# Patient Record
Sex: FEMALE | Race: WHITE | Employment: OTHER | URBAN - METROPOLITAN AREA
[De-identification: names, ages, dates, MRNs, and addresses within clinical notes are randomized per-mention and may not be internally consistent; named-entity substitution may affect disease eponyms.]

---

## 2022-12-09 ENCOUNTER — HOSPITAL ENCOUNTER (OUTPATIENT)
Age: 67
Setting detail: OUTPATIENT SURGERY
Discharge: HOME | End: 2022-12-09
Attending: SPECIALIST | Admitting: SPECIALIST
Payer: MEDICARE

## 2022-12-09 ENCOUNTER — ANESTHESIA (OUTPATIENT)
Dept: ENDOSCOPY | Age: 67
End: 2022-12-09
Payer: MEDICARE

## 2022-12-09 ENCOUNTER — ANESTHESIA EVENT (OUTPATIENT)
Dept: ENDOSCOPY | Age: 67
End: 2022-12-09
Payer: MEDICARE

## 2022-12-09 VITALS
BODY MASS INDEX: 41.09 KG/M2 | SYSTOLIC BLOOD PRESSURE: 121 MMHG | RESPIRATION RATE: 14 BRPM | HEIGHT: 70 IN | TEMPERATURE: 97.9 F | WEIGHT: 287.04 LBS | OXYGEN SATURATION: 100 % | DIASTOLIC BLOOD PRESSURE: 64 MMHG | HEART RATE: 56 BPM

## 2022-12-09 PROCEDURE — 74011250636 HC RX REV CODE- 250/636: Performed by: NURSE ANESTHETIST, CERTIFIED REGISTERED

## 2022-12-09 PROCEDURE — 2709999900 HC NON-CHARGEABLE SUPPLY: Performed by: SPECIALIST

## 2022-12-09 PROCEDURE — 74011000258 HC RX REV CODE- 258: Performed by: NURSE ANESTHETIST, CERTIFIED REGISTERED

## 2022-12-09 PROCEDURE — 76060000031 HC ANESTHESIA FIRST 0.5 HR: Performed by: SPECIALIST

## 2022-12-09 PROCEDURE — 76040000019: Performed by: SPECIALIST

## 2022-12-09 RX ORDER — SERTRALINE HYDROCHLORIDE 50 MG/1
50 TABLET, FILM COATED ORAL DAILY
COMMUNITY
Start: 2022-10-10

## 2022-12-09 RX ORDER — NALOXONE HYDROCHLORIDE 0.4 MG/ML
0.4 INJECTION, SOLUTION INTRAMUSCULAR; INTRAVENOUS; SUBCUTANEOUS
Status: DISCONTINUED | OUTPATIENT
Start: 2022-12-09 | End: 2022-12-09 | Stop reason: HOSPADM

## 2022-12-09 RX ORDER — GLUC/MSM/COLGN2/HYAL/ANTIARTH3 375-375-20
1 TABLET ORAL 3 TIMES DAILY
COMMUNITY

## 2022-12-09 RX ORDER — SIMVASTATIN 20 MG/1
20 TABLET, FILM COATED ORAL
COMMUNITY
Start: 2022-10-10

## 2022-12-09 RX ORDER — ACETYLCARNITINE HCL 100 %
POWDER (GRAM) MISCELLANEOUS
COMMUNITY

## 2022-12-09 RX ORDER — PROPOFOL 10 MG/ML
INJECTION, EMULSION INTRAVENOUS
Status: DISCONTINUED | OUTPATIENT
Start: 2022-12-09 | End: 2022-12-09 | Stop reason: HOSPADM

## 2022-12-09 RX ORDER — LEVOTHYROXINE SODIUM 112 UG/1
112 TABLET ORAL DAILY
COMMUNITY
Start: 2022-10-10

## 2022-12-09 RX ORDER — AA/PROT/LYSINE/METHIO/VIT C/B6 50-12.5 MG
TABLET ORAL
COMMUNITY

## 2022-12-09 RX ORDER — CALCIUM POLYCARBOPHIL 625 MG
TABLET ORAL
COMMUNITY

## 2022-12-09 RX ORDER — FLUMAZENIL 0.1 MG/ML
0.2 INJECTION INTRAVENOUS
Status: DISCONTINUED | OUTPATIENT
Start: 2022-12-09 | End: 2022-12-09 | Stop reason: HOSPADM

## 2022-12-09 RX ORDER — SODIUM CHLORIDE 9 MG/ML
INJECTION, SOLUTION INTRAVENOUS
Status: DISCONTINUED | OUTPATIENT
Start: 2022-12-09 | End: 2022-12-09 | Stop reason: HOSPADM

## 2022-12-09 RX ORDER — CYCLOSPORINE 0.5 MG/ML
EMULSION OPHTHALMIC
COMMUNITY
Start: 2022-01-18

## 2022-12-09 RX ORDER — SODIUM CHLORIDE 9 MG/ML
50 INJECTION, SOLUTION INTRAVENOUS CONTINUOUS
Status: DISCONTINUED | OUTPATIENT
Start: 2022-12-09 | End: 2022-12-09 | Stop reason: HOSPADM

## 2022-12-09 RX ORDER — FENTANYL CITRATE 50 UG/ML
25 INJECTION, SOLUTION INTRAMUSCULAR; INTRAVENOUS AS NEEDED
Status: DISCONTINUED | OUTPATIENT
Start: 2022-12-09 | End: 2022-12-09 | Stop reason: HOSPADM

## 2022-12-09 RX ORDER — MIDAZOLAM HYDROCHLORIDE 1 MG/ML
.25-5 INJECTION, SOLUTION INTRAMUSCULAR; INTRAVENOUS AS NEEDED
Status: DISCONTINUED | OUTPATIENT
Start: 2022-12-09 | End: 2022-12-09 | Stop reason: HOSPADM

## 2022-12-09 RX ORDER — DEXTROMETHORPHAN/PSEUDOEPHED 2.5-7.5/.8
1.2 DROPS ORAL
Status: DISCONTINUED | OUTPATIENT
Start: 2022-12-09 | End: 2022-12-09 | Stop reason: HOSPADM

## 2022-12-09 RX ADMIN — PROPOFOL 150 MCG/KG/MIN: 10 INJECTION, EMULSION INTRAVENOUS at 10:40

## 2022-12-09 RX ADMIN — SODIUM CHLORIDE: 9 INJECTION, SOLUTION INTRAVENOUS at 10:37

## 2022-12-09 NOTE — PROGRESS NOTES
Gini Garcia  1955  [id number]    Situation:  Verbal report received from:   Brenton Mckeon RN   Procedure: Procedure(s):  COLONOSCOPY  NO INFO TO     Background:    Preoperative diagnosis: PERSONAL HX OF COLONIC POLYPS, FX HX OF COLONIC POLYPS, FX HX OF MALIGNANT NEOPLASM OF GASTROINTESTINAL TRACT  Postoperative diagnosis: Previous surgery   diverticulosis    :  Dr. Mirlande Bynum  Assistant(s): Endoscopy Technician-1: Link Bautista  Endoscopy RN-1: Suni Dailey RN    Specimens: * No specimens in log *  H. Pylori  no    Assessment:  Intra-procedure medications     Anesthesia gave intra-procedure sedation and medications, see anesthesia flow sheet yes    Intravenous fluids: NS@ KVO     Vital signs stable   yes    Abdominal assessment: round and soft   yes    Recommendation:  Discharge patient per MD order  yes.   Return to floor  outpatient  Family or Friend   caregiver  Permission to share finding with family or friend yes

## 2022-12-09 NOTE — INTERVAL H&P NOTE
Pre-Endoscopy H&P Update  Chief complaint/HPI/ROS:  The indication for the procedure, the patient's history and the patient's current medications are reviewed prior to the procedure and that data is reported on the H&P to which this document is attached. Any significant complaints with regard to organ systems will be noted, and if not mentioned then a review of systems is not contributory. Past Medical History:   Diagnosis Date    Asthma     Cancer ([facility] [address]     skin cancer    Diverticulitis     Ill-defined condition     lung abcess    Ill-defined condition     colon abcess    Psychiatric disorder     depression    Sleep apnea     CPAP    Thyroid disease       Past Surgical History:   Procedure Laterality Date    HX CHOLECYSTECTOMY      HX OOPHORECTOMY      HX PARTIAL COLECTOMY      with ostomy and reversal     Social   Social History     Tobacco Use    Smoking status: Never    Smokeless tobacco: Never   Substance Use Topics    Alcohol use: Not Currently      History reviewed. No pertinent family history. Allergies   Allergen Reactions    Contrast Agent [Iodine] Hives      Prior to Admission Medications   Prescriptions Last Dose Informant Patient Reported? Taking? CHOLECALCIFEROL, VITAMIN D3, PO 12/8/2022  Yes Yes   Sig: Take  by mouth. LYSINE PO 12/8/2022  Yes Yes   Sig: Take  by mouth. MULTIVITAMIN 12 IV 12/6/2022  Yes No   Sig: multivitamin   1 tab PO daily   NIACINAMIDE PO 12/8/2022  Yes Yes   Sig: Take  by mouth. TURMERIC PO 12/8/2022  Yes Yes   Sig: Take  by mouth.    acetylcarnitine HCl (Acetylcarnitine, Bulk,) 100 % powd 12/8/2022  Yes Yes   Sig: acetylcarnitin HCl-a lipoic ac   1 tab PO daily   calcium polycarbophiL (FIBERCON) 625 mg tablet Not Taking  Yes No   Sig: FiberCon   3 tabs PO at night   Patient not taking: Reported on 12/9/2022   calcium polycarbophiL (FiberCon) 625 mg tablet 12/7/2022  Yes No   Sig: FiberCon   3 tabs PO at night   coenzyme q10 10 mg cap 12/8/2022  Yes Yes   Sig: Co Q-10 1 tab PO daily at night   cycloSPORINE (RESTASIS) 0.05 % dpet 12/9/2022  Yes Yes   docosahexanoic acid-eicosapent 120-180 mg cap Not Taking  Yes No   Sig: Take 1,000 mg by mouth daily. Patient not taking: Reported on 12/9/2022   glucosamine sulfate dipotassium-chondroitin sulfate 500-400 mg tablet 12/8/2022  Yes Yes   Sig: Take 1 Tablet by mouth three (3) times daily. levothyroxine (SYNTHROID) 112 mcg tablet 12/9/2022  Yes Yes   Sig: Take 112 mcg by mouth daily. sertraline (ZOLOFT) 50 mg tablet 12/8/2022  Yes Yes   Sig: Take 50 mg by mouth daily. simvastatin (ZOCOR) 20 mg tablet 12/8/2022  Yes Yes   Sig: Take 20 mg by mouth nightly. vitamin E acetate (VITAMIN E PO) 12/8/2022  Yes Yes   Sig: vitamin E   1 tab PO daily      Facility-Administered Medications: None       PHYSICAL EXAM:  The patient is examined immediately prior to the procedure. Visit Vitals  BP (!) 111/49   Pulse (!) 57   Temp 98.5 °F (36.9 °C)   Resp 12   Ht 5' 10\" (1.778 m)   Wt 130.2 kg (287 lb 0.6 oz)   SpO2 98%   Breastfeeding No   BMI 41.19 kg/m²     Gen: Appears comfortable, no distress. Pulm: comfortable respirations with no abnormal audible breath sounds  HEART: well perfused, no abnormal audible heart sounds  GI: abdomen flat. PLAN:  Informed consent discussion held, patient afforded an opportunity to ask questions and all questions answered. After being advised of the risks, benefits, and alternatives, the patient requested that we proceed and indicated so on a written consent form. Will proceed with procedure as planned.   Carie Marcano MD

## 2022-12-09 NOTE — ANESTHESIA POSTPROCEDURE EVALUATION
Procedure(s):  COLONOSCOPY  NO INFO TO .     MAC    Anesthesia Post Evaluation        Patient location during evaluation: bedside  Level of consciousness: awake  Pain management: satisfactory to patient  Airway patency: patent  Anesthetic complications: no  Cardiovascular status: acceptable  Respiratory status: acceptable  Hydration status: acceptable        INITIAL Post-op Vital signs:   Vitals Value Taken Time   /64 12/09/22 1120   Temp 36.6 °C (97.9 °F) 12/09/22 1110   Pulse 56 12/09/22 1120   Resp 14 12/09/22 1120   SpO2 100 % 12/09/22 1120

## 2022-12-09 NOTE — PROGRESS NOTES
Endoscopy discharge instructions have been reviewed and given to patient. The patient verbalized understanding and acceptance of instructions. Dr. Johanna Nowak discussed with patient procedure findings and next steps.

## 2022-12-09 NOTE — ANESTHESIA PREPROCEDURE EVALUATION
Relevant Problems   No relevant active problems       Anesthetic History   No history of anesthetic complications            Review of Systems / Medical History  Patient summary reviewed, nursing notes reviewed and pertinent labs reviewed    Pulmonary  Within defined limits      Sleep apnea    Asthma        Neuro/Psych   Within defined limits      Psychiatric history     Cardiovascular  Within defined limits                     GI/Hepatic/Renal  Within defined limits              Endo/Other  Within defined limits    Hypothyroidism       Other Findings              Physical Exam    Airway  Mallampati: II  TM Distance: 4 - 6 cm  Neck ROM: normal range of motion   Mouth opening: Normal     Cardiovascular    Rhythm: regular  Rate: normal         Dental  No notable dental hx       Pulmonary  Breath sounds clear to auscultation               Abdominal         Other Findings            Anesthetic Plan    ASA: 2  Anesthesia type: MAC            Anesthetic plan and risks discussed with: Patient

## 2022-12-09 NOTE — PROCEDURES
[facility] [address]  [phone]      2022    Colonoscopy Procedure Note  Eleni Grayson  :  1955  Jolie Medical Record Number: [id number]    Indications:     Personal history of colon polyps (screening only)  PCP:  Nicholas Oates MD  Anesthesia/Sedation: Conscious Sedation/Moderate Sedation/GETA, see notes  Endoscopist:  Dr. Elizabeth Negrete  Complications:  None  Estimated Blood Loss:  None    Permit:  The indications, risks, benefits and alternatives were reviewed with the patient or their decision maker who was provided an opportunity to ask questions and all questions were answered. The specific risks of colonoscopy with conscious sedation were reviewed, including but not limited to anesthetic complication, bleeding, adverse drug reaction, missed lesion, infection, IV site reactions, and intestinal perforation which would lead to the need for surgical repair. Alternatives to colonoscopy including radiographic imaging, observation without testing, or laboratory testing were reviewed including the limitations of those alternatives. After considering the options and having all their questions answered, the patient or their decision maker provided both verbal and written consent to proceed. Procedure in Detail:  After obtaining informed consent, positioning of the patient in the left lateral decubitus position, and conduction of a pre-procedure pause or \"time out\" the endoscope was introduced into the anus and advanced to the cecum, which was identified by the ileocecal valve and appendiceal orifice. The quality of the colonic preparation was good. A careful inspection was made as the colonoscope was withdrawn, findings and interventions are described below. Findings:   The patient is status post left partial colectomy with healthy anastomosis.     In the rectum, medium internal hemorrhoids are noted without bleeding. Specimens:    none    Complications:   None; patient tolerated the procedure well. Impression:  Otherwise normal colonoscopy to the cecum    Recommendations:      - Given the absence of polyps today, next colonoscopy 10 years. Thank you for entrusting me with this patient's care. Please do not hesitate to contact me with any questions or if I can be of assistance with any of your other patients' GI needs. Signed By: Ruthy Mckeon MD                        December 9, 2022      Surgical assistant none. Implants none unless specified.

## 2022-12-09 NOTE — H&P
78 y.o. female for open access colonoscopy for screening   Additional data for completion of the targeted pre-endoscopy H&P will be provided under 'H&P interval notes'. Please see that document which will be attached to this.   Omayra Wesley MD

## 2022-12-09 NOTE — PERIOP NOTES
Received recovery report from Anesthesia team, see anesthesia note. ABD remains soft and non-tender post procedure. Pt has no complaints at this time and tolerated the procedure well. Endoscope was pre-cleaned at bedside immediately following procedure by Cherie Doll. Post recovery report given to [facility].

## 2022-12-09 NOTE — DISCHARGE INSTRUCTIONS
[address] [facility] KULWANT Aguayo MD  [phone]      December 9, 2022 Sunday Gavin  YOB: 1955    COLONOSCOPY DISCHARGE INSTRUCTIONS    If there is redness at IV site you should apply warm compress to area. If redness or soreness persist contact Dr. Amber Aguayo' or your primary care doctor. There may be a slight amount of blood passed from the rectum. Gaseous discomfort may develop, but walking, belching will help relieve this. You may not operate a vehicle for 12 hours  You may not operate machinery or dangerous appliances for rest of today  You may not drink alcoholic beverages for 12 hours  Avoid making any critical decisions for 24 hours    DIET:  You may resume your normal diet, but some patients find that heavy or large meals may lead to indigestion or vomiting. I suggest a light meal as first food intake. MEDICATIONS:  The use of some over-the-counter pain medication may lead to bleeding after colon biopsies or polyp removal.  Tylenol (also called acetaminophen) is safe to take even if you have had colonoscopy with polyp removal.  Based on the procedure you had today you may safely take aspirin or aspirin-like products for the next ten (10) days. Remember that Tylenol (also called acetaminophen) is safe to take after colonoscopy even if you have had biopsies or polyps removed. ACTIVITY:  You may resume your normal household activities, but it is recommended that you spend the remainder of the day resting -  avoid any strenuous activity. CALL DR. Katharina Nava' OFFICE IF:  Increasing pain, nausea, vomiting  Abdominal distension (swelling)  Significant new or increased bleeding (oral or rectal)  Fever/Chills  Chest pain/shortness of breath                       Additional instructions:   Great news, no polys or colon cancer.   Current guidelines would suggest you can delay next colonoscopy for 10 years, but if you would prefer an earlier examination I would offer that in 5 years. If so, call the office at that time and we'll get that scheduled. It was an honor to be your doctor today. Please let me or my office staff know if you have any feedback about today's procedure. Helder Melissa MD    Colonoscopy saves lives, and can prevent colon cancer. Everyone aged 48 or older needs colonoscopy.   Tell your family and friends: get the test!

## 2023-05-21 RX ORDER — CHLORAL HYDRATE 500 MG
1000 CAPSULE ORAL DAILY
COMMUNITY

## 2023-05-21 RX ORDER — CYCLOSPORINE 0.5 MG/ML
EMULSION OPHTHALMIC
COMMUNITY
Start: 2022-01-18

## 2023-05-21 RX ORDER — LANOLIN ALCOHOL/MO/W.PET/CERES
1 CREAM (GRAM) TOPICAL 3 TIMES DAILY
COMMUNITY

## 2023-05-21 RX ORDER — SIMVASTATIN 20 MG
20 TABLET ORAL NIGHTLY
COMMUNITY
Start: 2022-10-10

## 2023-05-21 RX ORDER — CALCIUM POLYCARBOPHIL 625 MG
TABLET ORAL
COMMUNITY

## 2023-05-21 RX ORDER — LEVOTHYROXINE SODIUM 112 UG/1
112 TABLET ORAL DAILY
COMMUNITY
Start: 2022-10-10

## 2024-01-23 ENCOUNTER — TELEPHONE (OUTPATIENT)
Age: 69
End: 2024-01-23

## 2024-01-23 NOTE — TELEPHONE ENCOUNTER
Patient calling to schedule a new patient appt with Dr. Chicas. (Referral on file.) Please give her a call to schedule. Thank you!

## 2024-02-01 ENCOUNTER — TELEPHONE (OUTPATIENT)
Age: 69
End: 2024-02-01

## 2024-02-01 NOTE — TELEPHONE ENCOUNTER
Pt called stating she received an estimate in the mail and would like to go over it for more understanding. After mentioning [employer] billing to Pt she stated when she called them she wasn't successful in reaching a [employer] rep, so she's requesting a call back from our office to get a better understanding of the estimate.       Please call: [phone]

## 2024-07-23 ENCOUNTER — OFFICE VISIT (OUTPATIENT)
Age: 69
End: 2024-07-23
Payer: MEDICARE

## 2024-07-23 ENCOUNTER — PROCEDURE VISIT (OUTPATIENT)
Age: 69
End: 2024-07-23
Payer: MEDICARE

## 2024-07-23 DIAGNOSIS — R47.89 WORD FINDING PROBLEM: ICD-10-CM

## 2024-07-23 DIAGNOSIS — F41.1 GENERALIZED ANXIETY DISORDER: ICD-10-CM

## 2024-07-23 DIAGNOSIS — F33.41 RECURRENT MAJOR DEPRESSIVE DISORDER, IN PARTIAL REMISSION (HCC): Primary | ICD-10-CM

## 2024-07-23 DIAGNOSIS — R41.89 COGNITIVE CHANGE: Primary | ICD-10-CM

## 2024-07-23 DIAGNOSIS — Z76.89 SLEEP CONCERN: ICD-10-CM

## 2024-07-23 DIAGNOSIS — R41.840 ATTENTION DEFICIT: ICD-10-CM

## 2024-07-23 PROCEDURE — 4004F PT TOBACCO SCREEN RCVD TLK: CPT | Performed by: CLINICAL NEUROPSYCHOLOGIST

## 2024-07-23 PROCEDURE — 96139 PSYCL/NRPSYC TST TECH EA: CPT | Performed by: CLINICAL NEUROPSYCHOLOGIST

## 2024-07-23 PROCEDURE — 96132 NRPSYC TST EVAL PHYS/QHP 1ST: CPT | Performed by: CLINICAL NEUROPSYCHOLOGIST

## 2024-07-23 PROCEDURE — 1123F ACP DISCUSS/DSCN MKR DOCD: CPT | Performed by: CLINICAL NEUROPSYCHOLOGIST

## 2024-07-23 PROCEDURE — 96133 NRPSYC TST EVAL PHYS/QHP EA: CPT | Performed by: CLINICAL NEUROPSYCHOLOGIST

## 2024-07-23 PROCEDURE — 96138 PSYCL/NRPSYC TECH 1ST: CPT | Performed by: CLINICAL NEUROPSYCHOLOGIST

## 2024-07-23 PROCEDURE — 90791 PSYCH DIAGNOSTIC EVALUATION: CPT | Performed by: CLINICAL NEUROPSYCHOLOGIST

## 2024-07-23 NOTE — PROGRESS NOTES
Intake Note      Patient Name: Nadira Gerard  YOB: 1955    Age: 69 y.o.  Date of Intake: 7/23/2024   Education: 16 Ethnicity White   Gender: Female Referring Provider: Dr. Pitts     REASON FOR REFERRAL AND EVALUATION PROCEDURES:  Nadira Gerard  was referred for evaluation by her Neurologist to assist in differential diagnosis and individualized treatment planning. she understood the rationale and procedures for evaluation, as well as the limits to confidentiality, and agreed to participate. she consented to have this report made available to her  treating providers through her  electronic medical records.   History Sources: Patient and Medical Record    HISTORY OF PRESENT ILLNESS:  The patient is a 69-year-old female with pertinent medical history noted for heart disease, thyroid disease, high cholesterol, sleep apnea, and depression.  She presented independently for clinical interview and was capable of providing adequate history.  Per the patient's report, she has been noticing changes in her cognitive functioning for \"a little while\" that have become more concerning to her, particularly given a family history of neurological issues.  She reported her paternal grandfather and 2 of her paternal aunts developed dementia, her paternal uncle had Parkinson's disease, and her mother passed away due to GBM.  The patient describes her cognitive concerns to include attention deficits (e.g., losing her train of thought), working memory issues (e.g., difficulties holding onto information while completing other tasks), and less efficient word retrieval.  She stated these issues have not interfered in her daily functioning and she remains fully independent in all ADLs and IADLs.    Pertaining to the patient's emotional functioning, she reported a nearly lifelong history of depression and anxiety.  Symptoms of these conditions have been exacerbated at various times following significant stressors, such as

## 2024-08-02 NOTE — PROGRESS NOTES
Neuropsychological Evaluation Report      Patient Name: Nadira Gerard  YOB: 1955    Age: 69 y.o.  Date of Intake: 2024   Education: 16 Date of Testin2024   Gender: Female Ethnicity: White     Referring Provider: Dr. Pitts     REASON FOR REFERRAL AND EVALUATION PROCEDURES:  Nadira Gerard  was referred for neuropsychological evaluation by her Neurologist to obtain a quantitative assessment of her current level of neurocognitive functioning, assist in differential diagnosis, and aid in individualized treatment planning. The patient understood the rationale and procedures for evaluation, as well as the limits to confidentiality, and they agreed to participate. The patient consented to have this report made available to her  treating providers through her  electronic medical records. This evaluation was completed with the patient by Alan Chicas PsyD with the exception of testing by technician, which was completed by MELODIE Beth under the supervision of Dr. Chicas.  History Sources: Patient, Medical Record, and Test Data    SUMMARY AND IMPRESSION:  The following section is a summary of the patient's pertinent test results and impressions. A more thorough review of the patient's background and test scores can be found below.    The patient's neuropsychological evaluation revealed cognitive performances were entirely within normal limits and they were generally commensurate with expectations of premorbid functioning (high average).  However, the patient reported experiencing mild symptoms of anxiety, clinically significant poor sleep quality, and elevated daytime sleepiness.  Her perceived cognitive concerns are likely related to normal aging in addition to emotional distress and poor sleep.        ASSESSMENT:  Cognitive change - NOT SUPPORTED  Generalized anxiety disorder  Sleep concern    RECOMMENDATIONS:  The patient currently has a feedback session scheduled for 2024. During

## 2024-08-06 ENCOUNTER — OFFICE VISIT (OUTPATIENT)
Age: 69
End: 2024-08-06
Payer: MEDICARE

## 2024-08-06 DIAGNOSIS — R41.89 COGNITIVE CHANGE: Primary | ICD-10-CM

## 2024-08-06 PROCEDURE — 96132 NRPSYC TST EVAL PHYS/QHP 1ST: CPT | Performed by: CLINICAL NEUROPSYCHOLOGIST

## 2024-08-06 NOTE — PROGRESS NOTES
Prior to seeing the patient for today's visit, I reviewed pertinent records, including the previously completed report, the records in Epic, and any updated visits from other providers since the patient's last visit.    I provided feedback services related to the previously completed report. Attendees included: Patient. Education was provided regarding my diagnostic impressions, and we discussed treatment plan/options. Attendees were provided with the opportunity to ask questions, which were answered to the best of my ability.    We discussed, in detail, the following:  Reviewed findings from evaluation including test results, diagnosis, and suspected contributing factors  Discussed recommendations outlined in report  Answered questions to the best of my ability    The patient needs to:   Follow up with referring provider for ongoing management and Emphasize modifiable risk and protective factors for cognitive functioning (e.g., exercise, diet, cognitive stimulation; see handout)    The patient had the following reactions to recommendations: Patient reported understanding results and recommendations.  She appeared relieved that testing did not reveal any significant cognitive weaknesses.  She is currently engaging in routine exercise and eats a healthy diet but we reviewed modifiable protective factors.    ASSESSMENT:  Cognitive change - NOT SUPPORTED    TIME SPENT PROVIDING SERVICES:   31 minutes     BILLING:  96132 x 1 Units    *Code 57474 Neuropsychological testing evaluation services include: Integration of patient data, interpretation of standardized test results and clinical data, clinical decision-making, treatment planning and report, and interactive feedback to the patient, family member(s) or caregiver(s), when performed.

## 2025-05-15 ENCOUNTER — TRANSCRIBE ORDERS (OUTPATIENT)
Facility: HOSPITAL | Age: 70
End: 2025-05-15

## 2025-05-15 ENCOUNTER — HOSPITAL ENCOUNTER (OUTPATIENT)
Facility: HOSPITAL | Age: 70
Discharge: HOME | End: 2025-05-18
Payer: MEDICARE

## 2025-05-15 DIAGNOSIS — M53.3 COCCYODYNIA: Primary | ICD-10-CM

## 2025-05-15 DIAGNOSIS — M53.3 COCCYODYNIA: ICD-10-CM

## 2025-05-15 PROCEDURE — 72220 X-RAY EXAM SACRUM TAILBONE: CPT

## 2025-06-23 ENCOUNTER — HOSPITAL ENCOUNTER (EMERGENCY)
Facility: HOSPITAL | Age: 70
Discharge: HOME | End: 2025-06-23
Attending: STUDENT IN AN ORGANIZED HEALTH CARE EDUCATION/TRAINING PROGRAM
Payer: MEDICARE

## 2025-06-23 ENCOUNTER — APPOINTMENT (OUTPATIENT)
Facility: HOSPITAL | Age: 70
End: 2025-06-23
Payer: MEDICARE

## 2025-06-23 VITALS
HEIGHT: 70 IN | BODY MASS INDEX: 30.92 KG/M2 | TEMPERATURE: 98.1 F | SYSTOLIC BLOOD PRESSURE: 138 MMHG | OXYGEN SATURATION: 96 % | HEART RATE: 63 BPM | RESPIRATION RATE: 11 BRPM | WEIGHT: 216 LBS | DIASTOLIC BLOOD PRESSURE: 66 MMHG

## 2025-06-23 DIAGNOSIS — R00.2 PALPITATIONS: Primary | ICD-10-CM

## 2025-06-23 LAB
ANION GAP SERPL CALC-SCNC: 11 MMOL/L (ref 2–12)
BASOPHILS # BLD: 0.05 K/UL (ref 0–0.1)
BASOPHILS NFR BLD: 0.9 % (ref 0–1)
BUN SERPL-MCNC: 20 MG/DL (ref 8–23)
BUN/CREAT SERPL: 35 (ref 12–20)
CALCIUM SERPL-MCNC: 9.5 MG/DL (ref 8.8–10.2)
CHLORIDE SERPL-SCNC: 105 MMOL/L (ref 98–107)
CO2 SERPL-SCNC: 24 MMOL/L (ref 22–29)
CREAT SERPL-MCNC: 0.57 MG/DL (ref 0.5–0.9)
DIFFERENTIAL METHOD BLD: NORMAL
EOSINOPHIL # BLD: 0.11 K/UL (ref 0–0.4)
EOSINOPHIL NFR BLD: 1.9 % (ref 0–7)
ERYTHROCYTE [DISTWIDTH] IN BLOOD BY AUTOMATED COUNT: 13.2 % (ref 11.5–14.5)
GLUCOSE SERPL-MCNC: 89 MG/DL (ref 65–100)
HCT VFR BLD AUTO: 39.3 % (ref 35–47)
HGB BLD-MCNC: 13.3 G/DL (ref 11.5–16)
IMM GRANULOCYTES # BLD AUTO: 0.02 K/UL (ref 0–0.04)
IMM GRANULOCYTES NFR BLD AUTO: 0.4 % (ref 0–0.5)
LYMPHOCYTES # BLD: 1.35 K/UL (ref 0.8–3.5)
LYMPHOCYTES NFR BLD: 23.7 % (ref 12–49)
MAGNESIUM SERPL-MCNC: 2.1 MG/DL (ref 1.6–2.4)
MCH RBC QN AUTO: 31.7 PG (ref 26–34)
MCHC RBC AUTO-ENTMCNC: 33.8 G/DL (ref 30–36.5)
MCV RBC AUTO: 93.6 FL (ref 80–99)
MONOCYTES # BLD: 0.5 K/UL (ref 0–1)
MONOCYTES NFR BLD: 8.8 % (ref 5–13)
NEUTS SEG # BLD: 3.67 K/UL (ref 1.8–8)
NEUTS SEG NFR BLD: 64.3 % (ref 32–75)
NRBC # BLD: 0 K/UL (ref 0–0.01)
NRBC BLD-RTO: 0 PER 100 WBC
PLATELET # BLD AUTO: 257 K/UL (ref 150–400)
PMV BLD AUTO: 11.2 FL (ref 8.9–12.9)
POTASSIUM SERPL-SCNC: 4.3 MMOL/L (ref 3.5–5.1)
POTASSIUM SERPL-SCNC: 5.5 MMOL/L (ref 3.5–5.1)
RBC # BLD AUTO: 4.2 M/UL (ref 3.8–5.2)
SODIUM SERPL-SCNC: 140 MMOL/L (ref 136–145)
TROPONIN T SERPL HS-MCNC: 9.2 NG/L (ref 0–14)
WBC # BLD AUTO: 5.7 K/UL (ref 3.6–11)

## 2025-06-23 PROCEDURE — 93005 ELECTROCARDIOGRAM TRACING: CPT

## 2025-06-23 PROCEDURE — 84484 ASSAY OF TROPONIN QUANT: CPT

## 2025-06-23 PROCEDURE — 71045 X-RAY EXAM CHEST 1 VIEW: CPT

## 2025-06-23 PROCEDURE — 80048 BASIC METABOLIC PNL TOTAL CA: CPT

## 2025-06-23 PROCEDURE — 36415 COLL VENOUS BLD VENIPUNCTURE: CPT

## 2025-06-23 PROCEDURE — 99285 EMERGENCY DEPT VISIT HI MDM: CPT

## 2025-06-23 PROCEDURE — 84132 ASSAY OF SERUM POTASSIUM: CPT

## 2025-06-23 PROCEDURE — 83735 ASSAY OF MAGNESIUM: CPT

## 2025-06-23 PROCEDURE — 85025 COMPLETE CBC W/AUTO DIFF WBC: CPT

## 2025-06-23 ASSESSMENT — PAIN - FUNCTIONAL ASSESSMENT: PAIN_FUNCTIONAL_ASSESSMENT: NONE - DENIES PAIN

## 2025-06-23 NOTE — ED NOTES
Patient states she has never been formally diagnosed with afib by a physician but the reading from her Apple Watch stated afib as the rhythm yesterday

## 2025-06-23 NOTE — ED TRIAGE NOTES
Pt c/o her smart watch said she was in A-fib 3 times yesterday and she had some \"flutters\" during. Her PCP sent her to be evaluated. Pt denies any symptoms now.   Patient arrives to ED ambulatory w/o difficulty. No acute distress noted in triage. A&O x 4. Skin is warm, dry & intact on obs.   EKG in triage. NSR

## 2025-06-23 NOTE — ED PROVIDER NOTES
were made to edit the dictations but occasionally words are mis-transcribed.)    Mart Márquez PA-C (electronically signed)  Physician Assistant            Mart Márquez PA-C  06/23/25 4012

## 2025-06-24 LAB
EKG ATRIAL RATE: 68 BPM
EKG DIAGNOSIS: NORMAL
EKG P AXIS: 57 DEGREES
EKG P-R INTERVAL: 146 MS
EKG Q-T INTERVAL: 376 MS
EKG QRS DURATION: 76 MS
EKG QTC CALCULATION (BAZETT): 399 MS
EKG R AXIS: 59 DEGREES
EKG T AXIS: 65 DEGREES
EKG VENTRICULAR RATE: 68 BPM

## 2025-06-24 PROCEDURE — 93010 ELECTROCARDIOGRAM REPORT: CPT | Performed by: INTERNAL MEDICINE

## (undated) DEVICE — CUFF RMFG BP INF SZ 11 DISP -- LAWSON OEM ITEM 238915

## (undated) DEVICE — BLUNTFILL: Brand: MONOJECT

## (undated) DEVICE — SYR 3ML LL TIP 1/10ML GRAD --

## (undated) DEVICE — KIT COLON W/ 1.1OZ LUB AND 2 END

## (undated) DEVICE — SET GRAV CK VLV NEEDLESS ST 3 GANGED 4WAY STPCOCK HI FLO 10

## (undated) DEVICE — SYR 5ML 1/5 GRAD LL NSAF LF --

## (undated) DEVICE — SOLIDIFIER MEDC 1200ML -- CONVERT TO 356117

## (undated) DEVICE — Device

## (undated) DEVICE — BLUNTFILL WITH FILTER: Brand: MONOJECT

## (undated) DEVICE — ELECTRODE,RADIOTRANSLUCENT,FOAM,3PK: Brand: MEDLINE

## (undated) DEVICE — ADULT SPO2 SENSOR,REMANUFACTURED,REPROCESSED DEVICE FOR SINGLE USE; REPROCESSED BY COVIDIEN LLC: Brand: NELLCOR

## (undated) DEVICE — 3M™ CUROS™ DISINFECTING CAP FOR NEEDLELESS CONNECTORS 270/CARTON 20 CARTONS/CASE CFF1-270: Brand: CUROS™

## (undated) DEVICE — CATH IV AUTOGRD BC PNK 20GA 25 -- INSYTE

## (undated) DEVICE — SIMPLICITY FLUFF UNDERPAD 23X36, MODERATE: Brand: SIMPLICITY

## (undated) DEVICE — 1200 GUARD II KIT W/5MM TUBE W/O VAC TUBE: Brand: GUARDIAN

## (undated) DEVICE — BAG BELONG PT PERS CLEAR HANDL